# Patient Record
Sex: MALE | Race: BLACK OR AFRICAN AMERICAN | URBAN - METROPOLITAN AREA
[De-identification: names, ages, dates, MRNs, and addresses within clinical notes are randomized per-mention and may not be internally consistent; named-entity substitution may affect disease eponyms.]

---

## 2019-12-18 ENCOUNTER — OFFICE VISIT (OUTPATIENT)
Dept: FAMILY MEDICINE CLINIC | Age: 54
End: 2019-12-18

## 2019-12-18 VITALS
TEMPERATURE: 96.5 F | HEIGHT: 66 IN | RESPIRATION RATE: 16 BRPM | OXYGEN SATURATION: 95 % | HEART RATE: 89 BPM | SYSTOLIC BLOOD PRESSURE: 177 MMHG | WEIGHT: 256 LBS | BODY MASS INDEX: 41.14 KG/M2 | DIASTOLIC BLOOD PRESSURE: 114 MMHG

## 2019-12-18 DIAGNOSIS — E66.01 OBESITY, MORBID (HCC): ICD-10-CM

## 2019-12-18 RX ORDER — AMLODIPINE BESYLATE 10 MG/1
TABLET ORAL
COMMUNITY
Start: 2019-12-13

## 2019-12-18 RX ORDER — CHLORTHALIDONE 25 MG/1
TABLET ORAL
COMMUNITY
Start: 2019-11-14

## 2019-12-18 RX ORDER — ASPIRIN 81 MG/1
TABLET ORAL DAILY
COMMUNITY

## 2019-12-18 RX ORDER — BUSPIRONE HYDROCHLORIDE 10 MG/1
TABLET ORAL
COMMUNITY
Start: 2019-11-14

## 2019-12-18 RX ORDER — NAPROXEN 500 MG/1
TABLET ORAL
COMMUNITY
Start: 2019-09-11 | End: 2019-12-18

## 2019-12-18 RX ORDER — CARVEDILOL 25 MG/1
TABLET ORAL
COMMUNITY
Start: 2019-12-13

## 2019-12-18 RX ORDER — ATORVASTATIN CALCIUM 40 MG/1
TABLET, FILM COATED ORAL
COMMUNITY
Start: 2019-11-14

## 2019-12-18 NOTE — PROGRESS NOTES
Medicaid assigned to us. Off BP meds for a week. States was out of town. Just got meds today. Knows at increased risk for MI and stroke. Has HTN of meds with high readings in office. On statin for chol. On Buspar for anxiety. Had stent '17. Sees card this week. Denies other health problems. Previous PCP was Dr. Angel Hartley. Card is [facility] at [employer]. Last PE 3 mos ago. Colonoscopy '19 at [facility]. Given order for mammo by [facility]. Visit Vitals  BP (!) 177/114 (BP 1 Location: Left arm, BP Patient Position: Sitting)   Pulse 89   Temp 96.5 °F (35.8 °C) (Oral)   Resp 16   Ht 5' 5.5\" (1.664 m)   Wt 256 lb (116.1 kg)   SpO2 95%   BMI 41.95 kg/m²     Patient alert and cooperative. Reviewed above. Assessment:  1. HTN, markedly elevated off meds. 2. Hx of CAD, post stent, on statin, followed by cardiologist.    Plan:  1. Get records. 2. Get back on BP meds tonight. 3. Return in two weeks for recheck of BP on all his meds. 4. Follow otherwise here prn. TOTAL FACE TO FACE TIME OF 10 MINUTES SPENT WITH PATIENT. GREATER THAN 50% OF TIME WAS SPENT IN COUNSELING AND/OR COORDINATION OF CARE. SEE ASSESSMENT AND PLAN FOR DETAILS.

## 2019-12-18 NOTE — PROGRESS NOTES
.  Chief Complaint   Patient presents with   [address]     dr Candie navarro previous PCP     . Visit Vitals  BP (!) 177/114 (BP 1 Location: Left arm, BP Patient Position: Sitting)   Pulse 89   Temp 96.5 °F (35.8 °C) (Oral)   Resp 16   Ht 5' 5.5\" (1.664 m)   Wt 256 lb (116.1 kg)   SpO2 95%   BMI 41.95 kg/m²     . Monika Boone